# Patient Record
Sex: FEMALE | URBAN - METROPOLITAN AREA
[De-identification: names, ages, dates, MRNs, and addresses within clinical notes are randomized per-mention and may not be internally consistent; named-entity substitution may affect disease eponyms.]

---

## 2022-03-28 ENCOUNTER — OFFICE VISIT (OUTPATIENT)
Dept: URBAN - METROPOLITAN AREA CLINIC 3 | Facility: CLINIC | Age: 87
End: 2022-03-28
Payer: COMMERCIAL

## 2022-03-28 DIAGNOSIS — H43.393 OTHER VITREOUS OPACITIES, BILATERAL: ICD-10-CM

## 2022-03-28 DIAGNOSIS — H10.45 OTHER CHRONIC ALLERGIC CONJUNCTIVITIS: ICD-10-CM

## 2022-03-28 DIAGNOSIS — H26.493 OTHER SECONDARY CATARACT, BILATERAL: ICD-10-CM

## 2022-03-28 DIAGNOSIS — E11.9 DIABETES MELLITUS TYPE 2 WITHOUT MENTION OF COMPLICATION: Primary | ICD-10-CM

## 2022-03-28 DIAGNOSIS — H02.825 CYSTS OF LEFT LOWER EYELID: ICD-10-CM

## 2022-03-28 PROCEDURE — 99204 OFFICE O/P NEW MOD 45 MIN: CPT | Performed by: OPHTHALMOLOGY

## 2022-03-28 RX ORDER — OLOPATADINE HYDROCHLORIDE OPHTHALMIC 2 MG/ML
0.2 % SOLUTION OPHTHALMIC
Qty: 5 | Refills: 3 | Status: ACTIVE
Start: 2022-03-28

## 2022-03-28 ASSESSMENT — INTRAOCULAR PRESSURE
OD: 9
OS: 9

## 2022-03-28 NOTE — IMPRESSION/PLAN
Impression: Other vitreous opacities, bilateral: H43.393. Plan: Vitreous membranes and strands- reviewed the signs and symptoms of possible retinal detachment (flashes, floaters, change in peripheral vision, etc). Advised to contact us immediately for any of these or other new symptoms.

## 2022-03-28 NOTE — IMPRESSION/PLAN
Impression: Other chronic allergic conjunctivitis: H10.45. Plan: Discussed diagnosis in detail with patient. Discussed treatment options with patient. Start Olopatadine BID OU. Will continue to observe condition and or symptoms.

## 2022-03-28 NOTE — IMPRESSION/PLAN
Impression: Other secondary cataract, bilateral: H26.493. Plan: Posterior capsular opacity - patient symptomatic with blurry vision after cataract surgery that affects the activities of daily living. The risks, benefits, and alternatives of YAG laser capsulotomy were discussed with the patient, who stated an understanding of the above, having all questions answered, and a desire to proceed with the procedure. YAG OS, then OD.

## 2022-03-28 NOTE — IMPRESSION/PLAN
Impression: Cysts of left lower eyelid: H02.825. Plan: Eyelid Neoplasm Removal - appears to be a benign lesion but the patients is symptomatic and desires removal. Will schedule for removal after YAG OU.

## 2022-03-28 NOTE — IMPRESSION/PLAN
Impression: Diabetes mellitus Type 2 without mention of complication: D16.1. Plan: Diabetes type II: no background retinopathy, no signs of neovascularization noted. Discussed ocular and systemic benefits of blood sugar control.

## 2022-05-02 ENCOUNTER — LASER (OUTPATIENT)
Dept: URBAN - METROPOLITAN AREA SURGERY 1 | Facility: SURGERY | Age: 87
End: 2022-05-02
Payer: COMMERCIAL

## 2022-05-02 ENCOUNTER — Encounter (OUTPATIENT)
Dept: URBAN - METROPOLITAN AREA SURGERY 2 | Facility: SURGERY | Age: 87
End: 2022-05-02
Payer: COMMERCIAL

## 2022-05-02 PROCEDURE — 66821 AFTER CATARACT LASER SURGERY: CPT | Performed by: OPHTHALMOLOGY

## 2022-05-16 ENCOUNTER — Encounter (OUTPATIENT)
Dept: URBAN - METROPOLITAN AREA SURGERY 2 | Facility: SURGERY | Age: 87
End: 2022-05-16
Payer: COMMERCIAL

## 2022-05-16 ENCOUNTER — LASER (OUTPATIENT)
Dept: URBAN - METROPOLITAN AREA SURGERY 1 | Facility: SURGERY | Age: 87
End: 2022-05-16
Payer: COMMERCIAL

## 2022-05-16 PROCEDURE — 66821 AFTER CATARACT LASER SURGERY: CPT | Performed by: OPHTHALMOLOGY

## 2022-05-23 ENCOUNTER — PROCEDURE (OUTPATIENT)
Dept: URBAN - METROPOLITAN AREA CLINIC 3 | Facility: CLINIC | Age: 87
End: 2022-05-23
Payer: COMMERCIAL

## 2022-05-23 DIAGNOSIS — H02.825 CYSTS OF LEFT LOWER EYELID: Primary | ICD-10-CM

## 2022-05-23 PROCEDURE — 11440 EXC FACE-MM B9+MARG 0.5 CM/<: CPT | Performed by: OPHTHALMOLOGY

## 2022-05-23 RX ORDER — ERYTHROMYCIN 5 MG/G
OINTMENT OPHTHALMIC
Qty: 3.5 | Refills: 0 | Status: ACTIVE
Start: 2022-05-23

## 2022-05-23 NOTE — IMPRESSION/PLAN
Impression: Cysts of left lower eyelid: H02.825. Plan: Eyelid Neoplasm Removal Left lower lid- appears to be a benign lesion but the patients is symptomatic and desires removal. Risk, benefits and alternatives discussed and understood, informed consent obtained. Plan to perform today. Patient tolerated well the procedure. Start erythromycin ointment BID for 1 week. RTC prn.